# Patient Record
Sex: MALE | Race: WHITE | HISPANIC OR LATINO
[De-identification: names, ages, dates, MRNs, and addresses within clinical notes are randomized per-mention and may not be internally consistent; named-entity substitution may affect disease eponyms.]

---

## 2023-02-22 PROBLEM — Z00.00 ENCOUNTER FOR PREVENTIVE HEALTH EXAMINATION: Status: ACTIVE | Noted: 2023-02-22

## 2023-02-24 ENCOUNTER — NON-APPOINTMENT (OUTPATIENT)
Age: 78
End: 2023-02-24

## 2023-02-24 ENCOUNTER — APPOINTMENT (OUTPATIENT)
Dept: PULMONOLOGY | Facility: CLINIC | Age: 78
End: 2023-02-24
Payer: MEDICARE

## 2023-02-24 VITALS
HEIGHT: 68 IN | WEIGHT: 223.99 LBS | BODY MASS INDEX: 33.95 KG/M2 | HEART RATE: 73 BPM | TEMPERATURE: 98.4 F | DIASTOLIC BLOOD PRESSURE: 72 MMHG | OXYGEN SATURATION: 97 % | SYSTOLIC BLOOD PRESSURE: 124 MMHG

## 2023-02-24 DIAGNOSIS — Z80.41 FAMILY HISTORY OF MALIGNANT NEOPLASM OF OVARY: ICD-10-CM

## 2023-02-24 DIAGNOSIS — Z86.39 PERSONAL HISTORY OF OTHER ENDOCRINE, NUTRITIONAL AND METABOLIC DISEASE: ICD-10-CM

## 2023-02-24 DIAGNOSIS — I25.10 ATHEROSCLEROTIC HEART DISEASE OF NATIVE CORONARY ARTERY W/OUT ANGINA PECTORIS: ICD-10-CM

## 2023-02-24 DIAGNOSIS — Z87.891 PERSONAL HISTORY OF NICOTINE DEPENDENCE: ICD-10-CM

## 2023-02-24 DIAGNOSIS — J84.9 INTERSTITIAL PULMONARY DISEASE, UNSPECIFIED: ICD-10-CM

## 2023-02-24 DIAGNOSIS — U07.1 COVID-19: ICD-10-CM

## 2023-02-24 DIAGNOSIS — Z86.79 PERSONAL HISTORY OF OTHER DISEASES OF THE CIRCULATORY SYSTEM: ICD-10-CM

## 2023-02-24 DIAGNOSIS — E11.9 TYPE 2 DIABETES MELLITUS W/OUT COMPLICATIONS: ICD-10-CM

## 2023-02-24 PROCEDURE — 99204 OFFICE O/P NEW MOD 45 MIN: CPT | Mod: 25

## 2023-02-24 PROCEDURE — 94010 BREATHING CAPACITY TEST: CPT

## 2023-02-24 RX ORDER — PIOGLITAZONE HYDROCHLORIDE 45 MG/1
45 TABLET ORAL
Refills: 0 | Status: ACTIVE | COMMUNITY

## 2023-02-24 RX ORDER — SIMVASTATIN 20 MG/1
20 TABLET, FILM COATED ORAL
Refills: 0 | Status: ACTIVE | COMMUNITY

## 2023-02-24 RX ORDER — AMLODIPINE BESYLATE 10 MG/1
10 TABLET ORAL
Refills: 0 | Status: ACTIVE | COMMUNITY

## 2023-02-24 RX ORDER — LOSARTAN POTASSIUM 50 MG/1
50 TABLET, FILM COATED ORAL
Refills: 0 | Status: ACTIVE | COMMUNITY

## 2023-02-24 RX ORDER — TAMSULOSIN HYDROCHLORIDE 0.4 MG/1
0.4 CAPSULE ORAL
Refills: 0 | Status: ACTIVE | COMMUNITY

## 2023-02-24 RX ORDER — GLIPIZIDE AND METFORMIN HYDROCHLORIDE 5; 500 MG/1; MG/1
5-500 TABLET, FILM COATED ORAL
Refills: 0 | Status: ACTIVE | COMMUNITY

## 2023-02-24 RX ORDER — OMEPRAZOLE 20 MG/1
20 CAPSULE, DELAYED RELEASE ORAL
Refills: 0 | Status: ACTIVE | COMMUNITY

## 2023-02-24 RX ORDER — INSULIN GLARGINE 100 [IU]/ML
100 INJECTION, SOLUTION SUBCUTANEOUS
Refills: 0 | Status: ACTIVE | COMMUNITY

## 2023-03-21 ENCOUNTER — APPOINTMENT (OUTPATIENT)
Dept: PULMONOLOGY | Facility: CLINIC | Age: 78
End: 2023-03-21

## 2023-05-21 NOTE — HISTORY OF PRESENT ILLNESS
[Former] : former [Never] : never [TextBox_4] : 2/24/23  1)Dyspnea -- for the last few years  at rest no problem  with any exertion he is dyspneic   2) He has a hx of cardiac disease : MI 2007 and /cath X2  2 stents each time  3) smoker 1ppd   but stopped  for the last 10 yrs   4)He has gained weight since he retired as    4 yrs ago  weighs 225 lbs  6)  He had COVID  in 1/2023  no residual  He denies prior lung disease, denies cough, sputum wheeze  chest tightness  [TextBox_13] : 20 [TextBox_11] : 1 [YearQuit] : 2007

## 2023-05-21 NOTE — REASON FOR VISIT
[Initial] : an initial visit [Shortness of Breath] : shortness of breath [Pulmonary Nodules] : pulmonary nodules [TextBox_44] : Patient states he has SOB with activity.  He had a CT scan last week and he is here to go over the results.

## 2023-05-21 NOTE — ASSESSMENT
[FreeTextEntry1] : 2/23/24  The patient is a 79 yo  male with DM CAD and morbid obesity.  He has developed increasing dyspnea that is not attributed to his morbid obesity or his CAD,  He had a CT of his chest that is abnormal  The report suggests he has ILD  and on his PE he has bibasilar crackles   I reviewed the Ct  and agree it suggests ILD and a 5mm nodule . He has no known illnesses associated with interstitial disease. and the nodule is unchanged from 2020. His spirometry shows mild impairment with the FEV1 of 75% . The flow volume loop indicates he may not have optimally preformed the loop. He needs a full PFt to obtain a DLCO and repeat the spirometry and this would be a baseline which  to compare serial scans. The patient also has obesity and known CAD with stents.   time spent 45 min counseling, education, documentation, imaging reviewed, medication reviewed,  old records reviewed, HX and PE

## 2023-05-21 NOTE — PHYSICAL EXAM
[No Acute Distress] : no acute distress [Normal Oropharynx] : normal oropharynx [Normal Appearance] : normal appearance [No Neck Mass] : no neck mass [Normal Rate/Rhythm] : normal rate/rhythm [Normal S1, S2] : normal s1, s2 [No Murmurs] : no murmurs [No Resp Distress] : no resp distress [Clear to Auscultation Bilaterally] : clear to auscultation bilaterally [No Abnormalities] : no abnormalities [Benign] : benign [Normal Gait] : normal gait [No Clubbing] : no clubbing [No Cyanosis] : no cyanosis [No Edema] : no edema [FROM] : FROM [Normal Color/ Pigmentation] : normal color/ pigmentation [No Focal Deficits] : no focal deficits [Oriented x3] : oriented x3 [Normal Affect] : normal affect [TextBox_2] : BMI 34 [TextBox_68] : crackles  in the posterior  chest

## 2023-05-21 NOTE — REVIEW OF SYSTEMS
[Nasal Congestion] : nasal congestion [Dry Mouth] : dry mouth [Cough] : cough [GERD] : gerd [Constipation] : constipation [Nocturia] : nocturia [Frequency] : dysuria [Dysuria] : urgency [Back Pain] : back pain [Chronic Pain] : chronic pain [Memory Loss] : memory loss [Anxiety] : anxiety [Panic Attacks] : panic attacks [Diabetes] : diabetes [Obesity] : obesity [Negative] : Hematologic [Recent Wt Gain (___ Lbs)] : ~T no recent weight gain [Recent Wt Loss (___ Lbs)] : ~T no recent weight loss [Postnasal Drip] : no postnasal drip [Sinus Problems] : no sinus problems [Seasonal Allergies] : no seasonal allergies [Depression] : no depression [Thyroid Problem] : no thyroid problem [TextBox_3] : morbidly obese  bmi 34 [TextBox_14] : nasal congest nocturnally  mouth dry  in AM [TextBox_30] : cough  with eating  [TextBox_44] : stents  and prior  MI 2007 [TextBox_83] : bph [TextBox_119] : can't remember names

## 2024-02-01 ENCOUNTER — APPOINTMENT (OUTPATIENT)
Dept: PULMONOLOGY | Facility: CLINIC | Age: 79
End: 2024-02-01
Payer: MEDICARE

## 2024-02-01 ENCOUNTER — LABORATORY RESULT (OUTPATIENT)
Age: 79
End: 2024-02-01

## 2024-02-01 VITALS
TEMPERATURE: 97.6 F | OXYGEN SATURATION: 96 % | DIASTOLIC BLOOD PRESSURE: 70 MMHG | HEIGHT: 68 IN | SYSTOLIC BLOOD PRESSURE: 108 MMHG | HEART RATE: 83 BPM | RESPIRATION RATE: 16 BRPM | BODY MASS INDEX: 33.04 KG/M2 | WEIGHT: 218 LBS

## 2024-02-01 DIAGNOSIS — Z78.9 OTHER SPECIFIED HEALTH STATUS: ICD-10-CM

## 2024-02-01 PROCEDURE — 99215 OFFICE O/P EST HI 40 MIN: CPT

## 2024-02-01 RX ORDER — SEMAGLUTIDE 1.34 MG/ML
2 INJECTION, SOLUTION SUBCUTANEOUS
Refills: 0 | Status: ACTIVE | COMMUNITY

## 2024-02-09 ENCOUNTER — APPOINTMENT (OUTPATIENT)
Dept: CT IMAGING | Facility: CLINIC | Age: 79
End: 2024-02-09
Payer: MEDICARE

## 2024-02-09 PROCEDURE — 71250 CT THORAX DX C-: CPT

## 2024-02-14 NOTE — PHYSICAL EXAM
[Well Nourished] : well nourished [No Acute Distress] : no acute distress [IV] : Mallampati Class: IV [Normal Appearance] : normal appearance [Supple] : supple [No Neck Mass] : no neck mass [Normal Rate/Rhythm] : normal rate/rhythm [Normal S1, S2] : normal s1, s2 [No Abnormalities] : no abnormalities [Benign] : benign [Normal Gait] : normal gait [TextBox_11] : +Vanduser Palsy of right face [TextBox_68] : Crackles appreciated bilaterally

## 2024-02-14 NOTE — HISTORY OF PRESENT ILLNESS
[Former] : former [TextBox_4] : 78 year old male presenting for evaluation of interstitial lung disease. About 10 months ago he started seeing Dr. Iraheta for work up of sleep apnea. In that process he was found to have interstitial lung disease.  He is currently being treated for DEMETRIO, using nasal CPAP which he just received on Saturday.  In terms of his exercise tolerance he is able to climb a flight of stairs but feels tired at the end. ET has remained the same over the past year. Was previously getting dizzy spells but since diagnosed with Afib and ablated these symptoms have improved.   He is a former smoker, quit in 2007. At the time was smoking 1 PPD x 40 years.  He has had chicken coup on his property for the past 40 years which he cleans and works with regularly.  Also uses hay which is used to keep the chickens warm Dry eyes but he has Primghar Palsy Reports dry mouth +neuropathy. Joint pains in shoulder and elbow. No rashes. No family history of autoimmune or lung disease.   Reports that initial work up for pulmonary started during clearance for colonoscopy 3 years ago when he was found to have crackles on lung exam..  Full 12 point ROS as per HPI otherwise negative.

## 2024-02-15 LAB
MISCELLANEOUS TEST: NORMAL
PROC NAME: NORMAL

## 2024-02-26 LAB
EJ AB SER QL: NEGATIVE
ENA JO1 AB SER IA-ACNC: <20 UNITS
ENA PM/SCL AB SER-ACNC: <20 UNITS
ENA SM+RNP AB SER IA-ACNC: <20 UNITS
ENA SS-A IGG SER QL: <20 UNITS
FIBRILLARIN AB SER QL: NEGATIVE
KU AB SER QL: NEGATIVE
MDA-5 (P140)(CADM-140): <20 UNITS
MI2 AB SER QL: NEGATIVE
NXP-2 (P140): <20 UNITS
OJ AB SER QL: NEGATIVE
PL12 AB SER QL: NEGATIVE
PL7 AB SER QL: NEGATIVE
SRP AB SERPL QL: NEGATIVE
TIF GAMMA (P155/140): <20 UNITS
U2 SNRNP AB SER QL: NEGATIVE

## 2024-02-28 ENCOUNTER — APPOINTMENT (OUTPATIENT)
Dept: INTERNAL MEDICINE | Facility: CLINIC | Age: 79
End: 2024-02-28
Payer: MEDICARE

## 2024-02-28 ENCOUNTER — APPOINTMENT (OUTPATIENT)
Dept: PULMONOLOGY | Facility: CLINIC | Age: 79
End: 2024-02-28
Payer: MEDICARE

## 2024-02-28 VITALS
WEIGHT: 217 LBS | SYSTOLIC BLOOD PRESSURE: 130 MMHG | HEIGHT: 68 IN | RESPIRATION RATE: 16 BRPM | OXYGEN SATURATION: 95 % | DIASTOLIC BLOOD PRESSURE: 78 MMHG | TEMPERATURE: 97.7 F | BODY MASS INDEX: 32.89 KG/M2 | HEART RATE: 86 BPM

## 2024-02-28 PROCEDURE — ZZZZZ: CPT

## 2024-02-28 PROCEDURE — 99214 OFFICE O/P EST MOD 30 MIN: CPT

## 2024-02-28 RX ORDER — FLUTICASONE FUROATE, UMECLIDINIUM BROMIDE AND VILANTEROL TRIFENATATE 200; 62.5; 25 UG/1; UG/1; UG/1
200-62.5-25 POWDER RESPIRATORY (INHALATION)
Qty: 1 | Refills: 5 | Status: ACTIVE | COMMUNITY
Start: 2024-02-28 | End: 1900-01-01

## 2024-03-04 NOTE — PHYSICAL EXAM
[No Acute Distress] : no acute distress [Well Nourished] : well nourished [Normal Appearance] : normal appearance [IV] : Mallampati Class: IV [Supple] : supple [No Neck Mass] : no neck mass [Normal Rate/Rhythm] : normal rate/rhythm [Normal S1, S2] : normal s1, s2 [No Abnormalities] : no abnormalities [Benign] : benign [TextBox_11] : +[place]co Palsy of right face [Normal Gait] : normal gait [TextBox_68] : Crackles appreciated bilaterally

## 2024-03-04 NOTE — REASON FOR VISIT
[Follow-Up] : a follow-up visit [Initial] : an initial visit [ILD] : ILD [Family Member] : family member

## 2024-03-04 NOTE — HISTORY OF PRESENT ILLNESS
[Former] : former [TextBox_4] : 78 year old male presenting for follow up of interstitial lung disease. About 10 months ago he started seeing Dr. Iraheta for work up of sleep apnea. In that process he was found to have interstitial lung disease.  Based on imaging findings as well of chicken coups in his home he was given a provisional diagnosis of fibrosing HP. Since last visit he has been doing well. Has minimized interaction with the birds and is now using a mask. In the interim patient was worked up for CTD with serologies being negative. HP panel negative as well.  CT scan was obtained since last visit showing significant improvement in ground glass opacities with persistent of lower lober subpleural reticulations with traction bronchiectasis. No honeycombing.

## 2024-03-04 NOTE — ASSESSMENT
[FreeTextEntry1] : Mr Wong is a 78 year old male who presents for evaluation of interstitial lung disease. 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 It is difficult for patient to get rid of chickens however we discussed how this is crucial to his health. The hay may also contain mold which can be causing the HP as well or contributing. At this time I have strongly advised patient of removal of the chicken coup and to be done with protective equipment. He is amenable to accomplishing this over the next three month period. As imaging and PFTs have improved, at this time will cautiously hold and watch off treatment while patient works on full removal of birds. He is also planning on moving from that home soon as well also. He will start pulmonary rehab in the interim as well.